# Patient Record
Sex: MALE | Race: BLACK OR AFRICAN AMERICAN | NOT HISPANIC OR LATINO | URBAN - METROPOLITAN AREA
[De-identification: names, ages, dates, MRNs, and addresses within clinical notes are randomized per-mention and may not be internally consistent; named-entity substitution may affect disease eponyms.]

---

## 2024-03-18 ENCOUNTER — EMERGENCY (EMERGENCY)
Facility: HOSPITAL | Age: 59
LOS: 1 days | Discharge: HOME | End: 2024-03-18
Attending: EMERGENCY MEDICINE
Payer: MEDICAID

## 2024-03-18 VITALS
HEART RATE: 61 BPM | DIASTOLIC BLOOD PRESSURE: 90 MMHG | OXYGEN SATURATION: 99 % | WEIGHT: 173.06 LBS | RESPIRATION RATE: 18 BRPM | SYSTOLIC BLOOD PRESSURE: 170 MMHG | TEMPERATURE: 98 F

## 2024-03-18 PROCEDURE — 99283 EMERGENCY DEPT VISIT LOW MDM: CPT

## 2024-03-18 PROCEDURE — 99284 EMERGENCY DEPT VISIT MOD MDM: CPT

## 2024-03-18 RX ORDER — METHADONE HYDROCHLORIDE 40 MG/1
40 TABLET ORAL ONCE
Refills: 0 | Status: DISCONTINUED | OUTPATIENT
Start: 2024-03-18 | End: 2024-03-18

## 2024-03-18 RX ORDER — ONDANSETRON 8 MG/1
4 TABLET, FILM COATED ORAL ONCE
Refills: 0 | Status: COMPLETED | OUTPATIENT
Start: 2024-03-18 | End: 2024-03-18

## 2024-03-18 RX ADMIN — ONDANSETRON 4 MILLIGRAM(S): 8 TABLET, FILM COATED ORAL at 08:08

## 2024-03-18 RX ADMIN — METHADONE HYDROCHLORIDE 40 MILLIGRAM(S): 40 TABLET ORAL at 08:08

## 2024-03-18 NOTE — ED PROVIDER NOTE - NSFOLLOWUPINSTRUCTIONS_ED_ALL_ED_FT
Opioid Withdrawal  Opioids are powerful substances that relieve pain. Opioids include illegal drugs, such as heroin, as well as prescription pain medicines, such as codeine, morphine, hydrocodone, and fentanyl. Opioid withdrawal can happen if you have been taking opioids for a period of time and then suddenly stop.    Opioid withdrawal can be mild to severe, and it may include a variety of different symptoms. It is not usually life-threatening.    What are the causes?  This condition is caused by taking opioids for a long period of time, usually over several weeks (or even only 5 days), and then doing any of the following:  Stopping use completely.  Rapidly reducing their use, either by reducing the dose or the frequency of use.  Taking a medicine to block the effect of the opioid (opioid antagonist).  What increases the risk?  The following factors may make you more likely to develop this condition:  Taking opioids for a long period of time.  Taking opioids incorrectly.  Having a history of opioid, alcohol, or illicit drug use and withdrawal from those substances.  What are the signs or symptoms?  Symptoms of this condition can be physical or mental. Mild physical symptoms include:  Nausea.  Muscle aches or spasms.  Watery eyes and runny nose.  Widening of the dark centers of the eyes (dilated pupils).  Flushing and itching of the skin.  Moderate symptoms of this condition include:  Stomach cramps and diarrhea.  Vomiting.  Increased blood pressure and fast pulse.  Chills or sweating.  Twitching or shaking (tremors).  Mental symptoms include:  Depression.  Anxiety.  Restlessness and irritability.  Trouble sleeping.  Increased craving for drugs.  When symptoms start and how long they last depend on the kind of opioid you have been taking.  Short-acting opioids, such as heroin and oxycodone, work fast and then lose their effect quickly. Symptoms occur within hours of stopping or reducing the amount you take. The worst symptoms (peak withdrawal) occur in 24–48 hours. Symptoms should diminish over the next 3 to 5 days.  Long-acting opioids, such as methadone, work for a longer period of time. Symptoms can occur within 30 hours of stopping or reducing the amount you take, and they usually resolve over the next 10 days or so.  There are also medicines that block the effects of opioids (opioid antagonists), such as naltrexone or naloxone, and partial agonists such as buprenorphine. If you take one of these medicines, symptoms begin within minutes.    How is this diagnosed?  This condition is diagnosed based on:  Your symptoms.  Your medical history, including:  Your history of drug and alcohol use.  The medicines you have been taking.  A physical exam.  Other tests, such as an ECG to look at the rhythm of your heart or blood tests to check for problems.  Your health care provider may ask you to see a mental health professional or addiction specialist.    How is this treated?  A group of people participating in a support group or counseling session.  Treatment for this condition is usually provided by mental health professionals with training in substance use disorders (addiction specialists). Treatment may involve:  Counseling. This treatment is also called talk therapy. It is provided by substance use treatment counselors.  Support groups. Support groups are run by people who have quit using opioids. They provide emotional support, advice, and guidance.  Medicines. The medicines used may depend on the severity of your withdrawal. Options may include:  Medicines to help reduce certain withdrawal symptoms, such as nausea, vomiting, diarrhea, or stomach cramps.  An opioid or opioid-like medicine, such as methadone or buprenorphine, to replace the opioid that you have been taking. You may be asked to take less and less of this medicine over time to reduce or prevent withdrawal symptoms.  Other medicines that may decrease the effects of withdrawal.  Follow these instructions at home:  Take over-the-counter and prescription medicines only as told by your health care provider.  Always check with your health care provider before starting any new medicines.  Keep all follow-up visits. This is important. Follow-up visits include mental health and counseling visits.  Contact a health care provider if:  You are not able to take your medicines as told.  Your symptoms get worse.  You take an opioid after stopping use, or you take more of an opioid than you have been.  You have questions about how to take your medicines or you are unsure of when to take them.  Get help right away if:  You have a seizure.  You lose consciousness.  You cannot stop vomiting.  You are unable to drink or eat, and you become weak and dizzy.  You develop chest pain, shortness of breath, or fever.  You have serious thoughts about hurting yourself or others.  These symptoms may represent a serious problem that is an emergency. Do not wait to see if the symptoms will go away. Get medical help right away. Call your local emergency services (911 in the [place] Do not drive yourself to the hospital.    If you ever feel like you may hurt yourself or others, or have thoughts about taking your own life, get help right away. Go to your nearest emergency department or:  Call your local emergency services (876 in the [place]  Call a suicide crisis helpline, such as the National Suicide Prevention Lifeline at 1-705.434.3782 or 914 in the [place] This is open 24 hours a day in the [place]  Text the Crisis Text Line at 830439 (in the [place]  Summary  Opioid withdrawal is a group of symptoms that can occur if you have been taking opioids for a period of time and suddenly stop.  Symptoms range from mild to severe, but opioid withdrawal is usually not life-threatening.  Common symptoms include anxiety, tremors, chills, body aches, nausea, vomiting, and diarrhea. Symptoms may last 5–10 days or so depending on the type of opioids that were involved.  Treatment may include counseling, support groups, and medicines.  This information is not intended to replace advice given to you by your health care provider. Make sure you discuss any questions you have with your health care provider.    Document Revised: 07/13/2022 Document Reviewed: 03/30/2022  [employer]vier Patient Education © 2024 [employer]

## 2024-03-18 NOTE — ED ADULT NURSE NOTE - NSFALLUNIVINTERV_ED_ALL_ED
Pt requesting testosterone labs.       Call back once available.   Bed/Stretcher in lowest position, wheels locked, appropriate side rails in place/Call bell, personal items and telephone in reach/Instruct patient to call for assistance before getting out of bed/chair/stretcher/Non-slip footwear applied when patient is off stretcher/[place] to call system/Physically safe environment - no spills, clutter or unnecessary equipment/Purposeful proactive rounding/Room/bathroom lighting operational, light cord in reach

## 2024-03-18 NOTE — ED PROVIDER NOTE - PATIENT PORTAL LINK FT
You can access the FollowMyHealth Patient Portal offered by [facility] by registering at the following website: [email or web address]. By joining [email or web address]’s FollowMyHealth portal, you will also be able to view your health information using other applications (apps) compatible with our system.
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
